# Patient Record
Sex: FEMALE | Race: WHITE
[De-identification: names, ages, dates, MRNs, and addresses within clinical notes are randomized per-mention and may not be internally consistent; named-entity substitution may affect disease eponyms.]

---

## 2018-05-04 ENCOUNTER — APPOINTMENT (OUTPATIENT)
Dept: DERMATOLOGY | Facility: CLINIC | Age: 83
End: 2018-05-04
Payer: MEDICARE

## 2018-05-04 VITALS — HEIGHT: 63 IN | WEIGHT: 146 LBS | BODY MASS INDEX: 25.87 KG/M2

## 2018-05-04 DIAGNOSIS — L82.1 OTHER SEBORRHEIC KERATOSIS: ICD-10-CM

## 2018-05-04 DIAGNOSIS — L81.4 OTHER MELANIN HYPERPIGMENTATION: ICD-10-CM

## 2018-05-04 DIAGNOSIS — Z80.8 FAMILY HISTORY OF MALIGNANT NEOPLASM OF OTHER ORGANS OR SYSTEMS: ICD-10-CM

## 2018-05-04 DIAGNOSIS — L57.0 ACTINIC KERATOSIS: ICD-10-CM

## 2018-05-04 PROCEDURE — 99213 OFFICE O/P EST LOW 20 MIN: CPT | Mod: 25

## 2018-05-04 PROCEDURE — 17003 DESTRUCT PREMALG LES 2-14: CPT

## 2018-05-04 PROCEDURE — 17000 DESTRUCT PREMALG LESION: CPT

## 2018-05-04 RX ORDER — AMOXICILLIN 500 MG/1
500 CAPSULE ORAL
Qty: 24 | Refills: 0 | Status: COMPLETED | COMMUNITY
Start: 2018-03-05

## 2018-05-04 RX ORDER — FEXOFENADINE HCL 60 MG
TABLET ORAL
Refills: 0 | Status: ACTIVE | COMMUNITY

## 2018-05-04 RX ORDER — BENZONATATE 200 MG/1
200 CAPSULE ORAL
Qty: 30 | Refills: 0 | Status: COMPLETED | COMMUNITY
Start: 2018-02-20

## 2020-10-07 ENCOUNTER — EMERGENCY (EMERGENCY)
Facility: HOSPITAL | Age: 85
LOS: 0 days | Discharge: HOME | End: 2020-10-07
Attending: STUDENT IN AN ORGANIZED HEALTH CARE EDUCATION/TRAINING PROGRAM
Payer: MEDICARE

## 2020-10-07 VITALS
HEART RATE: 72 BPM | DIASTOLIC BLOOD PRESSURE: 68 MMHG | SYSTOLIC BLOOD PRESSURE: 131 MMHG | OXYGEN SATURATION: 100 % | TEMPERATURE: 98 F | RESPIRATION RATE: 21 BRPM

## 2020-10-07 VITALS
SYSTOLIC BLOOD PRESSURE: 122 MMHG | OXYGEN SATURATION: 95 % | RESPIRATION RATE: 15 BRPM | HEART RATE: 64 BPM | DIASTOLIC BLOOD PRESSURE: 55 MMHG

## 2020-10-07 DIAGNOSIS — R07.9 CHEST PAIN, UNSPECIFIED: ICD-10-CM

## 2020-10-07 DIAGNOSIS — R06.02 SHORTNESS OF BREATH: ICD-10-CM

## 2020-10-07 DIAGNOSIS — Z98.89 OTHER SPECIFIED POSTPROCEDURAL STATES: Chronic | ICD-10-CM

## 2020-10-07 DIAGNOSIS — D64.9 ANEMIA, UNSPECIFIED: ICD-10-CM

## 2020-10-07 DIAGNOSIS — M81.0 AGE-RELATED OSTEOPOROSIS WITHOUT CURRENT PATHOLOGICAL FRACTURE: ICD-10-CM

## 2020-10-07 DIAGNOSIS — Z96.651 PRESENCE OF RIGHT ARTIFICIAL KNEE JOINT: ICD-10-CM

## 2020-10-07 DIAGNOSIS — Z96.659 PRESENCE OF UNSPECIFIED ARTIFICIAL KNEE JOINT: Chronic | ICD-10-CM

## 2020-10-07 DIAGNOSIS — Z88.6 ALLERGY STATUS TO ANALGESIC AGENT: ICD-10-CM

## 2020-10-07 DIAGNOSIS — E78.5 HYPERLIPIDEMIA, UNSPECIFIED: ICD-10-CM

## 2020-10-07 DIAGNOSIS — M06.9 RHEUMATOID ARTHRITIS, UNSPECIFIED: ICD-10-CM

## 2020-10-07 LAB
ALBUMIN SERPL ELPH-MCNC: 3.5 G/DL — SIGNIFICANT CHANGE UP (ref 3.3–5)
ALP SERPL-CCNC: 96 U/L — SIGNIFICANT CHANGE UP (ref 40–120)
ALT FLD-CCNC: 23 U/L — SIGNIFICANT CHANGE UP (ref 12–78)
ANION GAP SERPL CALC-SCNC: 6 MMOL/L — SIGNIFICANT CHANGE UP (ref 5–17)
APTT BLD: 32.7 SEC — SIGNIFICANT CHANGE UP (ref 27.5–35.5)
AST SERPL-CCNC: 18 U/L — SIGNIFICANT CHANGE UP (ref 15–37)
BASOPHILS # BLD AUTO: 0.11 K/UL — SIGNIFICANT CHANGE UP (ref 0–0.2)
BASOPHILS NFR BLD AUTO: 2.1 % — HIGH (ref 0–2)
BILIRUB SERPL-MCNC: 0.5 MG/DL — SIGNIFICANT CHANGE UP (ref 0.2–1.2)
BUN SERPL-MCNC: 14 MG/DL — SIGNIFICANT CHANGE UP (ref 7–23)
CALCIUM SERPL-MCNC: 8.8 MG/DL — SIGNIFICANT CHANGE UP (ref 8.5–10.1)
CHLORIDE SERPL-SCNC: 105 MMOL/L — SIGNIFICANT CHANGE UP (ref 96–108)
CO2 SERPL-SCNC: 28 MMOL/L — SIGNIFICANT CHANGE UP (ref 22–31)
CREAT SERPL-MCNC: 0.82 MG/DL — SIGNIFICANT CHANGE UP (ref 0.5–1.3)
EOSINOPHIL # BLD AUTO: 0.23 K/UL — SIGNIFICANT CHANGE UP (ref 0–0.5)
EOSINOPHIL NFR BLD AUTO: 4.4 % — SIGNIFICANT CHANGE UP (ref 0–6)
GLUCOSE SERPL-MCNC: 86 MG/DL — SIGNIFICANT CHANGE UP (ref 70–99)
HCT VFR BLD CALC: 44.9 % — SIGNIFICANT CHANGE UP (ref 34.5–45)
HGB BLD-MCNC: 14.5 G/DL — SIGNIFICANT CHANGE UP (ref 11.5–15.5)
IMM GRANULOCYTES NFR BLD AUTO: 0.2 % — SIGNIFICANT CHANGE UP (ref 0–1.5)
INR BLD: 0.97 RATIO — SIGNIFICANT CHANGE UP (ref 0.88–1.16)
LYMPHOCYTES # BLD AUTO: 1.49 K/UL — SIGNIFICANT CHANGE UP (ref 1–3.3)
LYMPHOCYTES # BLD AUTO: 28.4 % — SIGNIFICANT CHANGE UP (ref 13–44)
MAGNESIUM SERPL-MCNC: 2.2 MG/DL — SIGNIFICANT CHANGE UP (ref 1.6–2.6)
MCHC RBC-ENTMCNC: 29.1 PG — SIGNIFICANT CHANGE UP (ref 27–34)
MCHC RBC-ENTMCNC: 32.3 GM/DL — SIGNIFICANT CHANGE UP (ref 32–36)
MCV RBC AUTO: 90.2 FL — SIGNIFICANT CHANGE UP (ref 80–100)
MONOCYTES # BLD AUTO: 0.5 K/UL — SIGNIFICANT CHANGE UP (ref 0–0.9)
MONOCYTES NFR BLD AUTO: 9.5 % — SIGNIFICANT CHANGE UP (ref 2–14)
NEUTROPHILS # BLD AUTO: 2.91 K/UL — SIGNIFICANT CHANGE UP (ref 1.8–7.4)
NEUTROPHILS NFR BLD AUTO: 55.4 % — SIGNIFICANT CHANGE UP (ref 43–77)
NT-PROBNP SERPL-SCNC: 153 PG/ML — SIGNIFICANT CHANGE UP (ref 0–450)
PLATELET # BLD AUTO: 278 K/UL — SIGNIFICANT CHANGE UP (ref 150–400)
POTASSIUM SERPL-MCNC: 3.8 MMOL/L — SIGNIFICANT CHANGE UP (ref 3.5–5.3)
POTASSIUM SERPL-SCNC: 3.8 MMOL/L — SIGNIFICANT CHANGE UP (ref 3.5–5.3)
PROT SERPL-MCNC: 7.2 GM/DL — SIGNIFICANT CHANGE UP (ref 6–8.3)
PROTHROM AB SERPL-ACNC: 11.3 SEC — SIGNIFICANT CHANGE UP (ref 10.6–13.6)
RBC # BLD: 4.98 M/UL — SIGNIFICANT CHANGE UP (ref 3.8–5.2)
RBC # FLD: 13.5 % — SIGNIFICANT CHANGE UP (ref 10.3–14.5)
SODIUM SERPL-SCNC: 139 MMOL/L — SIGNIFICANT CHANGE UP (ref 135–145)
TROPONIN I SERPL-MCNC: <0.015 NG/ML — SIGNIFICANT CHANGE UP (ref 0.01–0.04)
TROPONIN I SERPL-MCNC: <0.015 NG/ML — SIGNIFICANT CHANGE UP (ref 0.01–0.04)
WBC # BLD: 5.25 K/UL — SIGNIFICANT CHANGE UP (ref 3.8–10.5)
WBC # FLD AUTO: 5.25 K/UL — SIGNIFICANT CHANGE UP (ref 3.8–10.5)

## 2020-10-07 PROCEDURE — 93010 ELECTROCARDIOGRAM REPORT: CPT

## 2020-10-07 PROCEDURE — 85025 COMPLETE CBC W/AUTO DIFF WBC: CPT

## 2020-10-07 PROCEDURE — 83880 ASSAY OF NATRIURETIC PEPTIDE: CPT

## 2020-10-07 PROCEDURE — 84484 ASSAY OF TROPONIN QUANT: CPT | Mod: 59

## 2020-10-07 PROCEDURE — 36415 COLL VENOUS BLD VENIPUNCTURE: CPT

## 2020-10-07 PROCEDURE — 93005 ELECTROCARDIOGRAM TRACING: CPT

## 2020-10-07 PROCEDURE — 99284 EMERGENCY DEPT VISIT MOD MDM: CPT | Mod: 25

## 2020-10-07 PROCEDURE — 80053 COMPREHEN METABOLIC PANEL: CPT

## 2020-10-07 PROCEDURE — 85610 PROTHROMBIN TIME: CPT

## 2020-10-07 PROCEDURE — 71045 X-RAY EXAM CHEST 1 VIEW: CPT

## 2020-10-07 PROCEDURE — 85379 FIBRIN DEGRADATION QUANT: CPT

## 2020-10-07 PROCEDURE — 85730 THROMBOPLASTIN TIME PARTIAL: CPT

## 2020-10-07 PROCEDURE — 99285 EMERGENCY DEPT VISIT HI MDM: CPT

## 2020-10-07 PROCEDURE — 83735 ASSAY OF MAGNESIUM: CPT

## 2020-10-07 PROCEDURE — 71045 X-RAY EXAM CHEST 1 VIEW: CPT | Mod: 26

## 2020-10-07 RX ORDER — ACETAMINOPHEN 500 MG
975 TABLET ORAL ONCE
Refills: 0 | Status: COMPLETED | OUTPATIENT
Start: 2020-10-07 | End: 2020-10-07

## 2020-10-07 RX ORDER — FAMOTIDINE 10 MG/ML
20 INJECTION INTRAVENOUS DAILY
Refills: 0 | Status: DISCONTINUED | OUTPATIENT
Start: 2020-10-07 | End: 2020-10-07

## 2020-10-07 RX ADMIN — FAMOTIDINE 20 MILLIGRAM(S): 10 INJECTION INTRAVENOUS at 12:43

## 2020-10-07 RX ADMIN — Medication 30 MILLILITER(S): at 12:44

## 2020-10-07 RX ADMIN — Medication 975 MILLIGRAM(S): at 12:43

## 2020-10-07 NOTE — ED STATDOCS - PMH
Anemia    HLD (hyperlipidemia)    Hyperlipidemia    Osteoporosis    Rheumatoid arthritis    Subdural hematoma  5/2015

## 2020-10-07 NOTE — ED STATDOCS - PSH
S/P appy    S/P evacuation of subdural hematoma    S/P knee replacement  right  S/P tonsillectomy    Status post mastoidectomy  right

## 2020-10-07 NOTE — ED PROVIDER NOTE - PATIENT PORTAL LINK FT
You can access the FollowMyHealth Patient Portal offered by [facility] by registering at the following website: [email or web address]. By joining [employer]’s FollowMyHealth portal, you will also be able to view your health information using other applications (apps) compatible with our system.

## 2020-10-07 NOTE — ED PROVIDER NOTE - PHYSICAL EXAMINATION
Vital signs as available reviewed.  General:  Comfortable, no acute distress.  Head:  Normocephalic, atraumatic.  Eyes:  Conjunctiva pink, no icterus.  Cardiovascular:  Regular rate, soft systolic murmur heard at the right sternal border  Respiratory:  Clear to auscultation, good air entry bilaterally.  Abdomen:  Soft, non-tender.  Musculoskeletal:  No deformity or calf tenderness.  Neurologic: Alert and oriented, moving all extremities.  Skin:  Warm and dry.

## 2020-10-07 NOTE — ED PROVIDER NOTE - NSFOLLOWUPCLINICS_GEN_ALL_ED_FT
[facility]  Family Medicine  [address] [place] [address]  Phone: [phone]  Fax:   Follow Up Time:

## 2020-10-07 NOTE — ED PROVIDER NOTE - NS_ ATTENDINGSCRIBEDETAILS _ED_A_ED_FT
I, George Galvan DO,  performed the initial face to face bedside interview with this patient regarding history of present illness, review of symptoms and relevant past medical, social and family history. I completed an independent physical examination. I was the initial provider who evaluated this patient.    The history, relevant review of systems, past medical and surgical history, medical decision making, and physical examination was documented by the scribe in my presence and I attest to the accuracy of the documentation.

## 2020-10-07 NOTE — ED STATDOCS - PROGRESS NOTE DETAILS
Daksha MACHUCA for ED attending, Dr. Art: 89 y/o F with PMHx of subdural hematoma s/p evacuation, HLD, anemia, RA, osteoporosis, s/p R TKR, s/p mastoidectomy, and s/p appendectomy presents to the ED BIB son c/o sudden onset of +chest pain described as tightness about 30 min PTA. Pain began while making her bed. Still feels chest tightness here in ED. Endorses associated +SOB. No arm pain, back pain, fever, or cough. Denies hx of cardiac disease. Allergic to codeine. PCP: Dr. Santi Bustillo. Will send pt to main ED for further evaluation.

## 2020-10-07 NOTE — ED PROVIDER NOTE - CLINICAL SUMMARY MEDICAL DECISION MAKING FREE TEXT BOX
89 y/o female with no cardiac hx here c/o Chest Pressure starting 30 min PTA arrival. Will  r/o ACS, PE, PNA, and PNX.

## 2020-10-07 NOTE — ED PROVIDER NOTE - PROGRESS NOTE DETAILS
Scribelly AS for Dr. Galvan: case discussed with Dr. Evans, reports that if second troponin is negative, pt can follow up as out patient in the office. patient feels better, chest pain resolved. anticipated plan of repeat troponin and discharge discussed with patient, she agrees with the plan. Barry ROMO: Received sign out from Dr. Galvan at 3 pm. Plan is to have patient's second troponin result. If second troponin is negative as per Dr. Galvan, patient is OK to go home. Patient's presentation discussed with Dr. Kendall by Dr. Galvan. As per sign out Cardiology is OK with patient going home. Patient also wishes to go home. Patient provided with the results of the labs and imaging prior to discharge. Instructed patient to return if any worsening of the symptoms or if any health concerns. Patien is happy to leave and will return if any health concerns.

## 2020-10-07 NOTE — ED PROVIDER NOTE - CARE PROVIDER_API CALL
Don Kendall  CARDIOVASCULAR DISEASE  [address], [place], [address]  [place] [address]  Phone: [phone]  Fax: [phone]  Follow Up Time: 1-3 Days

## 2020-10-07 NOTE — ED PROVIDER NOTE - NSFOLLOWUPINSTRUCTIONS_ED_ALL_ED_FT
Chest Pain    WHAT YOU NEED TO KNOW:    Chest pain can be caused by a range of conditions, from not serious to life-threatening. Chest pain can be a symptom of a digestive problem, such as acid reflux or a stomach ulcer. An anxiety attack or a strong emotion, such as anger, can also cause chest pain. Infection, inflammation, or a fracture in the bones or cartilage in your chest can cause pain or discomfort. Sometimes chest pain or pressure is caused by poor blood flow to your heart (angina). Chest pain may also be caused by life-threatening conditions such as a heart attack or blood clot in your lungs.     DISCHARGE INSTRUCTIONS:    Call 911 if:     You have any of the following signs of a heart attack:   Squeezing, pressure, or pain in your chest       and any of the following:   Discomfort or pain in your back, neck, jaw, stomach, or arm       Shortness of breath      Nausea or vomiting      Lightheadedness or a sudden cold sweat        Return to the emergency department if:     You have chest discomfort that gets worse, even with medicine.      You cough or vomit blood.       Your bowel movements are black or bloody.       You cannot stop vomiting, or it hurts to swallow.     Contact your healthcare provider if:     You have questions or concerns about your condition or care.        Medicines:     Medicines may be given to treat the cause of your chest pain. Examples include pain medicine, anxiety medicine, or medicines to increase blood flow to your heart.       Do not take certain medicines without asking your healthcare provider first. These include NSAIDs, herbal or vitamin supplements, or hormones (estrogen or progestin).       Take your medicine as directed. Contact your healthcare provider if you think your medicine is not helping or if you have side effects. Tell him or her if you are allergic to any medicine. Keep a list of the medicines, vitamins, and herbs you take. Include the amounts, and when and why you take them. Bring the list or the pill bottles to follow-up visits. Carry your medicine list with you in case of an emergency.    Follow up with your healthcare provider within 72 hours, or as directed: You may need to return for more tests to find the cause of your chest pain. You may be referred to a specialist, such as a cardiologist or gastroenterologist. Write down your questions so you remember to ask them during your visits.     Healthy living tips: The following are general healthy guidelines. If your chest pain is caused by a heart problem, your healthcare provider will give you specific guidelines to follow.    Do not smoke. Nicotine and other chemicals in cigarettes and cigars can cause lung and heart damage. Ask your healthcare provider for information if you currently smoke and need help to quit. E-cigarettes or smokeless tobacco still contain nicotine. Talk to your healthcare provider before you use these products.       Eat a variety of healthy, low-fat, low-salt foods. Healthy foods include fruits, vegetables, whole-grain breads, low-fat dairy products, beans, lean meats, and fish. Ask for more information about a heart healthy diet.      Drink plenty of water every day. Your body is made of mostly water. Water helps your body to control your temperature and blood pressure. Ask your healthcare provider how much water you should drink every day.      Ask about activity. Your healthcare provider will tell you which activities to limit or avoid. Ask when you can drive, return to work, and have sex. Ask about the best exercise plan for you.      Maintain a healthy weight. Ask your healthcare provider how much you should weigh. Ask him or her to help you create a weight loss plan if you are overweight.       Get the flu and pneumonia vaccines. All adults should get the influenza (flu) vaccine. Get it every year as soon as it becomes available. The pneumococcal vaccine is given to adults aged 65 years or older. The vaccine is given every 5 years to prevent pneumococcal disease, such as pneumonia.    If you have a stent:     Carry your stent card with you at all times.       Let all healthcare providers know that you have a stent. You have been provided with the results of your labs and imaging. As we discussed, we can not conclusively rule out the possibility of a heart attack with only the two sets of troponin enzymes that we had done in the emergency room however as per discussion between Dr. Galvan and cardiologist Dr. Kendall you are OK to leave but you are to follow up with cardiology in two days or less and return to us immediately if you have any repeat of the symptoms including any chest pain, shortness of breath, palpitation, or if any health concerns. For all other emergencies or if you change your mind and wish to be admitted to the hospital for further evaluation of your chest pain return to us immediately.    _______________      Chest Pain    WHAT YOU NEED TO KNOW:    Chest pain can be caused by a range of conditions, from not serious to life-threatening. Chest pain can be a symptom of a digestive problem, such as acid reflux or a stomach ulcer. An anxiety attack or a strong emotion, such as anger, can also cause chest pain. Infection, inflammation, or a fracture in the bones or cartilage in your chest can cause pain or discomfort. Sometimes chest pain or pressure is caused by poor blood flow to your heart (angina). Chest pain may also be caused by life-threatening conditions such as a heart attack or blood clot in your lungs.     DISCHARGE INSTRUCTIONS:    Call 911 if:     You have any of the following signs of a heart attack:   Squeezing, pressure, or pain in your chest       and any of the following:   Discomfort or pain in your back, neck, jaw, stomach, or arm       Shortness of breath      Nausea or vomiting      Lightheadedness or a sudden cold sweat        Return to the emergency department if:     You have chest discomfort that gets worse, even with medicine.      You cough or vomit blood.       Your bowel movements are black or bloody.       You cannot stop vomiting, or it hurts to swallow.     Contact your healthcare provider if:     You have questions or concerns about your condition or care.        Medicines:     Medicines may be given to treat the cause of your chest pain. Examples include pain medicine, anxiety medicine, or medicines to increase blood flow to your heart.       Do not take certain medicines without asking your healthcare provider first. These include NSAIDs, herbal or vitamin supplements, or hormones (estrogen or progestin).       Take your medicine as directed. Contact your healthcare provider if you think your medicine is not helping or if you have side effects. Tell him or her if you are allergic to any medicine. Keep a list of the medicines, vitamins, and herbs you take. Include the amounts, and when and why you take them. Bring the list or the pill bottles to follow-up visits. Carry your medicine list with you in case of an emergency.    Follow up with your healthcare provider within 72 hours, or as directed: You may need to return for more tests to find the cause of your chest pain. You may be referred to a specialist, such as a cardiologist or gastroenterologist. Write down your questions so you remember to ask them during your visits.     Healthy living tips: The following are general healthy guidelines. If your chest pain is caused by a heart problem, your healthcare provider will give you specific guidelines to follow.    Do not smoke. Nicotine and other chemicals in cigarettes and cigars can cause lung and heart damage. Ask your healthcare provider for information if you currently smoke and need help to quit. E-cigarettes or smokeless tobacco still contain nicotine. Talk to your healthcare provider before you use these products.       Eat a variety of healthy, low-fat, low-salt foods. Healthy foods include fruits, vegetables, whole-grain breads, low-fat dairy products, beans, lean meats, and fish. Ask for more information about a heart healthy diet.      Drink plenty of water every day. Your body is made of mostly water. Water helps your body to control your temperature and blood pressure. Ask your healthcare provider how much water you should drink every day.      Ask about activity. Your healthcare provider will tell you which activities to limit or avoid. Ask when you can drive, return to work, and have sex. Ask about the best exercise plan for you.      Maintain a healthy weight. Ask your healthcare provider how much you should weigh. Ask him or her to help you create a weight loss plan if you are overweight.       Get the flu and pneumonia vaccines. All adults should get the influenza (flu) vaccine. Get it every year as soon as it becomes available. The pneumococcal vaccine is given to adults aged 65 years or older. The vaccine is given every 5 years to prevent pneumococcal disease, such as pneumonia.    If you have a stent:     Carry your stent card with you at all times.       Let all healthcare providers know that you have a stent.

## 2020-10-07 NOTE — ED PROVIDER NOTE - NS ED ROS FT
Constitutional: No fever.  Neurological: No headache.  Eyes: No vision changes.   Ears, Nose, Mouth, Throat: No congestion.  Cardiovascular: +CP  Respiratory: +slight SOB  Gastrointestinal: No nausea or vomiting.  Genitourinary: No dysuria.  Musculoskeletal: No joint pain. +minimal back pain  Integumentary (skin and/or breast): No rash.

## 2020-10-07 NOTE — ED PROVIDER NOTE - OBJECTIVE STATEMENT
89 y/o F with PMHx of subdural hematoma s/p evacuation, HLD, anemia, RA, osteoporosis, s/p R TKR, s/p mastoidectomy, and s/p appendectomy presents to the ED c/o sudden onset of constant CP described as a tightness in the chest about 30 min PTA. Pt states there is minimal back pain. Pain started while pt was making her bed. Pt has no hx of cardiac disease. +SOB when mouth closed. Denies arm pain, back pain, fever, and cough dizziness, N/V, diaphoresis. Currently, pt states that her tightness has improved but still feels is a little now. Pt lives with her son. Allergic to codeine. Pt take Allegra.   PCP: Dr. Santi Bustillo.

## 2020-10-07 NOTE — ED ADULT NURSE NOTE - OBJECTIVE STATEMENT
89 year old AxOX 3 female patient presents with complaints of chest tightness since this morning. Endorses SOB. Denies any radiation to arm, neck or back. Denies n/v. Patient denies any pain or discomfort at this time.

## 2020-10-09 ENCOUNTER — NON-APPOINTMENT (OUTPATIENT)
Age: 85
End: 2020-10-09

## 2020-10-09 ENCOUNTER — APPOINTMENT (OUTPATIENT)
Dept: CARDIOLOGY | Facility: CLINIC | Age: 85
End: 2020-10-09
Payer: MEDICARE

## 2020-10-09 VITALS
TEMPERATURE: 98.5 F | BODY MASS INDEX: 25.87 KG/M2 | SYSTOLIC BLOOD PRESSURE: 132 MMHG | DIASTOLIC BLOOD PRESSURE: 74 MMHG | HEIGHT: 63 IN | OXYGEN SATURATION: 97 % | WEIGHT: 146 LBS | HEART RATE: 66 BPM | RESPIRATION RATE: 14 BRPM

## 2020-10-09 DIAGNOSIS — R07.9 CHEST PAIN, UNSPECIFIED: ICD-10-CM

## 2020-10-09 DIAGNOSIS — R01.1 CARDIAC MURMUR, UNSPECIFIED: ICD-10-CM

## 2020-10-09 PROBLEM — M81.0 AGE-RELATED OSTEOPOROSIS WITHOUT CURRENT PATHOLOGICAL FRACTURE: Chronic | Status: ACTIVE | Noted: 2020-10-07

## 2020-10-09 PROBLEM — E78.5 HYPERLIPIDEMIA, UNSPECIFIED: Chronic | Status: ACTIVE | Noted: 2020-10-07

## 2020-10-09 PROBLEM — D64.9 ANEMIA, UNSPECIFIED: Chronic | Status: ACTIVE | Noted: 2020-10-07

## 2020-10-09 PROBLEM — M06.9 RHEUMATOID ARTHRITIS, UNSPECIFIED: Chronic | Status: ACTIVE | Noted: 2020-10-07

## 2020-10-09 PROCEDURE — 99215 OFFICE O/P EST HI 40 MIN: CPT

## 2020-10-09 PROCEDURE — 93000 ELECTROCARDIOGRAM COMPLETE: CPT

## 2020-10-09 NOTE — DISCUSSION/SUMMARY
[FreeTextEntry1] : Chest pain: Resolved. Here today in ER F/U  Troponin, D-Dimer and EKG were negative.  Currently asymptomatic with NL EKG.  Murmur auscultated upon exam will return for Echocardiogram and follow  up OV with Dr Kendall

## 2020-10-09 NOTE — PHYSICAL EXAM
[General Appearance - Well Developed] : well developed [Normal Appearance] : normal appearance [Well Groomed] : well groomed [General Appearance - Well Nourished] : well nourished [No Deformities] : no deformities [General Appearance - In No Acute Distress] : no acute distress [Normal Conjunctiva] : the conjunctiva exhibited no abnormalities [Heart Rate And Rhythm] : heart rate and rhythm were normal [Heart Sounds] : normal S1 and S2 [] : no respiratory distress [Respiration, Rhythm And Depth] : normal respiratory rhythm and effort [Exaggerated Use Of Accessory Muscles For Inspiration] : no accessory muscle use [Auscultation Breath Sounds / Voice Sounds] : lungs were clear to auscultation bilaterally [Abdomen Soft] : soft [Abnormal Walk] : normal gait [FreeTextEntry1] : No LE Edema  [Skin Color & Pigmentation] : normal skin color and pigmentation [Oriented To Time, Place, And Person] : oriented to person, place, and time

## 2021-09-05 ENCOUNTER — EMERGENCY (EMERGENCY)
Facility: HOSPITAL | Age: 86
LOS: 0 days | Discharge: HOME | End: 2021-09-06
Attending: EMERGENCY MEDICINE
Payer: MEDICARE

## 2021-09-05 VITALS — HEIGHT: 63 IN | WEIGHT: 138.01 LBS

## 2021-09-05 VITALS
HEART RATE: 59 BPM | SYSTOLIC BLOOD PRESSURE: 131 MMHG | WEIGHT: 147.27 LBS | RESPIRATION RATE: 17 BRPM | TEMPERATURE: 98 F | OXYGEN SATURATION: 100 % | DIASTOLIC BLOOD PRESSURE: 54 MMHG

## 2021-09-05 DIAGNOSIS — Z98.89 OTHER SPECIFIED POSTPROCEDURAL STATES: Chronic | ICD-10-CM

## 2021-09-05 DIAGNOSIS — Z88.5 ALLERGY STATUS TO NARCOTIC AGENT: ICD-10-CM

## 2021-09-05 DIAGNOSIS — M06.9 RHEUMATOID ARTHRITIS, UNSPECIFIED: ICD-10-CM

## 2021-09-05 DIAGNOSIS — M81.0 AGE-RELATED OSTEOPOROSIS WITHOUT CURRENT PATHOLOGICAL FRACTURE: ICD-10-CM

## 2021-09-05 DIAGNOSIS — Z96.651 PRESENCE OF RIGHT ARTIFICIAL KNEE JOINT: ICD-10-CM

## 2021-09-05 DIAGNOSIS — E78.5 HYPERLIPIDEMIA, UNSPECIFIED: ICD-10-CM

## 2021-09-05 DIAGNOSIS — R07.89 OTHER CHEST PAIN: ICD-10-CM

## 2021-09-05 DIAGNOSIS — Z90.89 ACQUIRED ABSENCE OF OTHER ORGANS: ICD-10-CM

## 2021-09-05 DIAGNOSIS — Z96.659 PRESENCE OF UNSPECIFIED ARTIFICIAL KNEE JOINT: Chronic | ICD-10-CM

## 2021-09-05 DIAGNOSIS — Z87.820 PERSONAL HISTORY OF TRAUMATIC BRAIN INJURY: ICD-10-CM

## 2021-09-05 DIAGNOSIS — D64.9 ANEMIA, UNSPECIFIED: ICD-10-CM

## 2021-09-05 DIAGNOSIS — Z20.822 CONTACT WITH AND (SUSPECTED) EXPOSURE TO COVID-19: ICD-10-CM

## 2021-09-05 LAB
ALBUMIN SERPL ELPH-MCNC: 3.6 G/DL — SIGNIFICANT CHANGE UP (ref 3.3–5)
ALP SERPL-CCNC: 92 U/L — SIGNIFICANT CHANGE UP (ref 40–120)
ALT FLD-CCNC: 23 U/L — SIGNIFICANT CHANGE UP (ref 12–78)
ANION GAP SERPL CALC-SCNC: 6 MMOL/L — SIGNIFICANT CHANGE UP (ref 5–17)
APTT BLD: 31.8 SEC — SIGNIFICANT CHANGE UP (ref 27.5–35.5)
AST SERPL-CCNC: 28 U/L — SIGNIFICANT CHANGE UP (ref 15–37)
BASOPHILS # BLD AUTO: 0.13 K/UL — SIGNIFICANT CHANGE UP (ref 0–0.2)
BASOPHILS NFR BLD AUTO: 2.1 % — HIGH (ref 0–2)
BILIRUB SERPL-MCNC: 0.6 MG/DL — SIGNIFICANT CHANGE UP (ref 0.2–1.2)
BUN SERPL-MCNC: 11 MG/DL — SIGNIFICANT CHANGE UP (ref 7–23)
CALCIUM SERPL-MCNC: 8.5 MG/DL — SIGNIFICANT CHANGE UP (ref 8.5–10.1)
CHLORIDE SERPL-SCNC: 107 MMOL/L — SIGNIFICANT CHANGE UP (ref 96–108)
CO2 SERPL-SCNC: 26 MMOL/L — SIGNIFICANT CHANGE UP (ref 22–31)
CREAT SERPL-MCNC: 0.92 MG/DL — SIGNIFICANT CHANGE UP (ref 0.5–1.3)
EOSINOPHIL # BLD AUTO: 0.21 K/UL — SIGNIFICANT CHANGE UP (ref 0–0.5)
EOSINOPHIL NFR BLD AUTO: 3.4 % — SIGNIFICANT CHANGE UP (ref 0–6)
GLUCOSE SERPL-MCNC: 121 MG/DL — HIGH (ref 70–99)
HCT VFR BLD CALC: 45.4 % — HIGH (ref 34.5–45)
HGB BLD-MCNC: 14.8 G/DL — SIGNIFICANT CHANGE UP (ref 11.5–15.5)
IMM GRANULOCYTES NFR BLD AUTO: 0.3 % — SIGNIFICANT CHANGE UP (ref 0–1.5)
INR BLD: 0.97 RATIO — SIGNIFICANT CHANGE UP (ref 0.88–1.16)
LYMPHOCYTES # BLD AUTO: 1.47 K/UL — SIGNIFICANT CHANGE UP (ref 1–3.3)
LYMPHOCYTES # BLD AUTO: 24 % — SIGNIFICANT CHANGE UP (ref 13–44)
MCHC RBC-ENTMCNC: 28.9 PG — SIGNIFICANT CHANGE UP (ref 27–34)
MCHC RBC-ENTMCNC: 32.6 GM/DL — SIGNIFICANT CHANGE UP (ref 32–36)
MCV RBC AUTO: 88.7 FL — SIGNIFICANT CHANGE UP (ref 80–100)
MONOCYTES # BLD AUTO: 0.43 K/UL — SIGNIFICANT CHANGE UP (ref 0–0.9)
MONOCYTES NFR BLD AUTO: 7 % — SIGNIFICANT CHANGE UP (ref 2–14)
NEUTROPHILS # BLD AUTO: 3.87 K/UL — SIGNIFICANT CHANGE UP (ref 1.8–7.4)
NEUTROPHILS NFR BLD AUTO: 63.2 % — SIGNIFICANT CHANGE UP (ref 43–77)
NT-PROBNP SERPL-SCNC: 268 PG/ML — SIGNIFICANT CHANGE UP (ref 0–450)
PLATELET # BLD AUTO: 261 K/UL — SIGNIFICANT CHANGE UP (ref 150–400)
POTASSIUM SERPL-MCNC: 3.8 MMOL/L — SIGNIFICANT CHANGE UP (ref 3.5–5.3)
POTASSIUM SERPL-SCNC: 3.8 MMOL/L — SIGNIFICANT CHANGE UP (ref 3.5–5.3)
PROT SERPL-MCNC: 7.2 GM/DL — SIGNIFICANT CHANGE UP (ref 6–8.3)
PROTHROM AB SERPL-ACNC: 11.3 SEC — SIGNIFICANT CHANGE UP (ref 10.6–13.6)
RBC # BLD: 5.12 M/UL — SIGNIFICANT CHANGE UP (ref 3.8–5.2)
RBC # FLD: 13.4 % — SIGNIFICANT CHANGE UP (ref 10.3–14.5)
SODIUM SERPL-SCNC: 139 MMOL/L — SIGNIFICANT CHANGE UP (ref 135–145)
TROPONIN I SERPL-MCNC: <0.015 NG/ML — SIGNIFICANT CHANGE UP (ref 0.01–0.04)
TROPONIN I SERPL-MCNC: <0.015 NG/ML — SIGNIFICANT CHANGE UP (ref 0.01–0.04)
WBC # BLD: 6.13 K/UL — SIGNIFICANT CHANGE UP (ref 3.8–10.5)
WBC # FLD AUTO: 6.13 K/UL — SIGNIFICANT CHANGE UP (ref 3.8–10.5)

## 2021-09-05 PROCEDURE — 80061 LIPID PANEL: CPT

## 2021-09-05 PROCEDURE — 99284 EMERGENCY DEPT VISIT MOD MDM: CPT | Mod: CS

## 2021-09-05 PROCEDURE — 85610 PROTHROMBIN TIME: CPT

## 2021-09-05 PROCEDURE — 93306 TTE W/DOPPLER COMPLETE: CPT

## 2021-09-05 PROCEDURE — 83880 ASSAY OF NATRIURETIC PEPTIDE: CPT

## 2021-09-05 PROCEDURE — 85025 COMPLETE CBC W/AUTO DIFF WBC: CPT

## 2021-09-05 PROCEDURE — 99284 EMERGENCY DEPT VISIT MOD MDM: CPT | Mod: 25

## 2021-09-05 PROCEDURE — 93010 ELECTROCARDIOGRAM REPORT: CPT

## 2021-09-05 PROCEDURE — 85730 THROMBOPLASTIN TIME PARTIAL: CPT

## 2021-09-05 PROCEDURE — G0378: CPT

## 2021-09-05 PROCEDURE — 99219: CPT

## 2021-09-05 PROCEDURE — 84484 ASSAY OF TROPONIN QUANT: CPT | Mod: 59

## 2021-09-05 PROCEDURE — U0005: CPT

## 2021-09-05 PROCEDURE — 71045 X-RAY EXAM CHEST 1 VIEW: CPT

## 2021-09-05 PROCEDURE — 36415 COLL VENOUS BLD VENIPUNCTURE: CPT

## 2021-09-05 PROCEDURE — U0003: CPT

## 2021-09-05 PROCEDURE — 71045 X-RAY EXAM CHEST 1 VIEW: CPT | Mod: 26

## 2021-09-05 PROCEDURE — 80053 COMPREHEN METABOLIC PANEL: CPT

## 2021-09-05 PROCEDURE — 93005 ELECTROCARDIOGRAM TRACING: CPT

## 2021-09-05 RX ORDER — ASPIRIN/CALCIUM CARB/MAGNESIUM 324 MG
81 TABLET ORAL DAILY
Refills: 0 | Status: DISCONTINUED | OUTPATIENT
Start: 2021-09-05 | End: 2021-09-06

## 2021-09-05 RX ORDER — INFLUENZA VIRUS VACCINE 15; 15; 15; 15 UG/.5ML; UG/.5ML; UG/.5ML; UG/.5ML
0.5 SUSPENSION INTRAMUSCULAR ONCE
Refills: 0 | Status: DISCONTINUED | OUTPATIENT
Start: 2021-09-05 | End: 2021-09-06

## 2021-09-05 NOTE — ED PROVIDER NOTE - CROS ED GI ALL NEG
negative... Carac Pregnancy And Lactation Text: This medication is Pregnancy Category X and contraindicated in pregnancy and in women who may become pregnant. It is unknown if this medication is excreted in breast milk.

## 2021-09-05 NOTE — H&P ADULT - HISTORY OF PRESENT ILLNESS
89/F with PMHx of subdural hematoma s/p evacuation, HLD, anemia, RA, osteoporosis, s/p R TKR, s/p mastoidectomy, and s/p appendectomy, brought  to the ED for  c/o sudden onset  chest pain. She states that it was like tightness in the chest this morning. Still has some heaviness in chest. No known Hx of MI. stents, Angina. No c/o back pain, fever, cough, dizziness, N/V/D, diaphoresis.    1st trop neg

## 2021-09-05 NOTE — ED ADULT NURSE NOTE - OBJECTIVE STATEMENT
pt presents to ED with complaints of chest pain starting last night. pt states she has never had chest pain before. 18 g placed to right AC. labs sent. EKG performed. Tele and VS monitoring initiated.

## 2021-09-05 NOTE — ED PROVIDER NOTE - OBJECTIVE STATEMENT
89 y/o F with PMHx of subdural hematoma s/p evacuation, HLD, anemia, RA, osteoporosis, s/p R TKR, s/p mastoidectomy, and s/p appendectomy presents to the ED c/o sudden onset of constant CP described as a tightness in the chest about 30 min PTA. Pt states there is minimal back pain. Pain started while pt was making her bed. Pt has no hx of cardiac disease. +SOB when mouth closed. Denies arm pain, back pain, fever, and cough dizziness, N/V, diaphoresis. Currently, pt states that her tightness has improved but still feels is a little now. Pt lives with her son. Allergic to codeine. Pt take Allegra.   PCP: Dr. Santi Bustillo. PA: Patient is a 89 year old female with PMHx of subdural hematoma s/p evacuation, HLD, anemia, RA, osteoporosis, s/p R TKR, s/p mastoidectomy, and s/p appendectomy, who presents to [facility] c/o sudden onset of constant CP described as a tightness in the chest this morning. DENIES any known Hx of cardiac disease. DENIES radiation of pain, back pain, fever, cough, dizziness, N/V/D, diaphoresis. ~Marcin Aguila PA-C

## 2021-09-05 NOTE — H&P ADULT - NSICDXPASTMEDICALHX_GEN_ALL_CORE_FT
PAST MEDICAL HISTORY:  Anemia     HLD (hyperlipidemia)     Hyperlipidemia     Osteoporosis     Rheumatoid arthritis     Subdural hematoma 5/2015

## 2021-09-05 NOTE — ED ADULT NURSE NOTE - NSIMPLEMENTINTERV_GEN_ALL_ED
Implemented All Universal Safety Interventions:  [place] to call system. Call bell, personal items and telephone within reach. Instruct patient to call for assistance. Room bathroom lighting operational. Non-slip footwear when patient is off stretcher. Physically safe environment: no spills, clutter or unnecessary equipment. Stretcher in lowest position, wheels locked, appropriate side rails in place.

## 2021-09-05 NOTE — ED PROVIDER NOTE - CHPI ED SYMPTOMS NEG
no fever/no nausea/no syncope no back pain/no cough/no dizziness/no fever/no nausea/no shortness of breath/no syncope/no vomiting/no chills/no diaphoresis

## 2021-09-05 NOTE — H&P ADULT - ASSESSMENT
89/F with PMHx of subdural hematoma s/p evacuation, HLD, anemia, RA, osteoporosis, s/p R TKR, s/p mastoidectomy, and s/p appendectomy, brought  to the ED for  c/o sudden onset  chest pain. She states that it was like tightness in the chest this morning. Still has some heaviness in chest. No known Hx of MI. stents, Angina. No c/o back pain, fever, cough, dizziness, N/V/D, diaphoresis.    1st trop neg    Pt admitted with     1) Chest Pain: TINO vs GERD  observe on tele  serial cardiac enx  asa 81  lipid panel  echo  cardio consult    2) DVT PPX: lovenox    poc discussed with pt, team

## 2021-09-05 NOTE — H&P ADULT - NSHPLABSRESULTS_GEN_ALL_CORE
Lab Results:  CBC  CBC Full  -  ( 05 Sep 2021 14:44 )  WBC Count : 6.13 K/uL  RBC Count : 5.12 M/uL  Hemoglobin : 14.8 g/dL  Hematocrit : 45.4 %  Platelet Count - Automated : 261 K/uL  Mean Cell Volume : 88.7 fl  Mean Cell Hemoglobin : 28.9 pg  Mean Cell Hemoglobin Concentration : 32.6 gm/dL  Auto Neutrophil # : 3.87 K/uL  Auto Lymphocyte # : 1.47 K/uL  Auto Monocyte # : 0.43 K/uL  Auto Eosinophil # : 0.21 K/uL  Auto Basophil # : 0.13 K/uL  Auto Neutrophil % : 63.2 %  Auto Lymphocyte % : 24.0 %  Auto Monocyte % : 7.0 %  Auto Eosinophil % : 3.4 %  Auto Basophil % : 2.1 %    .		Differential:	[] Automated		[] Manual  Chemistry                        14.8   6.13  )-----------( 261      ( 05 Sep 2021 14:44 )             45.4     09-05    139  |  107  |  11  ----------------------------<  121<H>  3.8   |  26  |  0.92    Ca    8.5      05 Sep 2021 14:44    TPro  7.2  /  Alb  3.6  /  TBili  0.6  /  DBili  x   /  AST  28  /  ALT  23  /  AlkPhos  92  09-05    LIVER FUNCTIONS - ( 05 Sep 2021 14:44 )  Alb: 3.6 g/dL / Pro: 7.2 gm/dL / ALK PHOS: 92 U/L / ALT: 23 U/L / AST: 28 U/L / GGT: x           PT/INR - ( 05 Sep 2021 14:44 )   PT: 11.3 sec;   INR: 0.97 ratio         PTT - ( 05 Sep 2021 14:44 )  PTT:31.8 sec      EKG: nsr 68      RADIOLOGY RESULTS:    cxr: clear    MEDICATIONS  (STANDING):  aspirin  chewable 81 milliGRAM(s) Oral daily    MEDICATIONS  (PRN):  aluminum hydroxide/magnesium hydroxide/simethicone Suspension 30 milliLiter(s) Oral every 6 hours PRN Dyspepsia

## 2021-09-05 NOTE — ED PROVIDER NOTE - NSICDXPASTSURGICALHX_GEN_ALL_CORE_FT
PAST SURGICAL HISTORY:  S/P appy     S/P evacuation of subdural hematoma     S/P knee replacement right    S/P tonsillectomy     Status post mastoidectomy right

## 2021-09-06 ENCOUNTER — TRANSCRIPTION ENCOUNTER (OUTPATIENT)
Age: 86
End: 2021-09-06

## 2021-09-06 LAB
CHOLEST SERPL-MCNC: 284 MG/DL — HIGH
HDLC SERPL-MCNC: 52 MG/DL — SIGNIFICANT CHANGE UP
LIPID PNL WITH DIRECT LDL SERPL: 195 MG/DL — HIGH
NON HDL CHOLESTEROL: 232 MG/DL — HIGH
TRIGL SERPL-MCNC: 187 MG/DL — HIGH

## 2021-09-06 PROCEDURE — 93306 TTE W/DOPPLER COMPLETE: CPT | Mod: 26

## 2021-09-06 PROCEDURE — 99217: CPT

## 2021-09-06 RX ADMIN — Medication 81 MILLIGRAM(S): at 11:23

## 2021-09-06 NOTE — DISCHARGE NOTE NURSING/CASE MANAGEMENT/SOCIAL WORK - PATIENT PORTAL LINK FT
You can access the FollowMyHealth Patient Portal offered by [facility] by registering at the following website: [email or web address]. By joining [employer]’s FollowMyHealth portal, you will also be able to view your health information using other applications (apps) compatible with our system.

## 2021-09-06 NOTE — PROGRESS NOTE ADULT - ASSESSMENT
89/F with PMHx of subdural hematoma s/p evacuation, HLD, anemia, RA, osteoporosis, s/p R TKR, s/p mastoidectomy, and s/p appendectomy, brought  to the ED for  c/o sudden onset  chest pain. She states that it was like tightness in the chest this morning. Still has some heaviness in chest. No known Hx of MI. stents, Angina. No c/o back pain, fever, cough, dizziness, N/V/D, diaphoresis.      1) Chest Pain:   - Possible GERD/Atypical CP  observe on tele  serial cardiac enx  asa 81  lipid panel  echo  cardio consult    2) DVT PPX: lovenox

## 2021-09-06 NOTE — CONSULT NOTE ADULT - CONSULT REASON
Social History:   reports that she has never smoked. She has never used smokeless tobacco. She reports that she drinks about 0.6 oz of alcohol per week . She reports that she does not use drugs. Family History: family history includes Arthritis in her maternal grandfather. Allergies:  Nsaids [nsaids]; Hctz; Polysporin [bacitracin-polymyxin b]; and Tolmetin    Home Medications:    Prior to Admission medications    Medication Sig Start Date End Date Taking? Authorizing Provider   verapamil (CALAN) 80 MG tablet Take 80 mg by mouth 3 times daily   Yes Historical Provider, MD   propafenone (RYTHMOL) 300 MG tablet Take 1 tablet by mouth daily as needed (One Tablet Daily As Needed) Take 1 tablets by mouth as needed. Patient taking differently: Take 150 mg by mouth daily as needed (One Tablet Daily As Needed) Take 1 tablets by mouth as needed. 9/6/17  Yes Audelia Hernadez MD   dabigatran (PRADAXA) 150 MG capsule Take 1 capsule by mouth as needed (a fib) 9/1/17  Yes Audelia Hernadez MD   levothyroxine (SYNTHROID) 75 MCG tablet Take 1 tablet by mouth daily 2/9/17  Yes Gail Alicea MD   metoprolol (LOPRESSOR) 100 MG tablet Take 1.5 tablets by mouth 2 times daily  Patient taking differently: Take 50 mg by mouth three times daily  7/18/16  Yes Gail Alicea MD   estradiol (ESTRACE) 0.1 MG/GM vaginal cream PLACE 1 GRAM VAGINALLY TWICE A WEEK 10/1/18   Jen Quijano DO   lisinopril (PRINIVIL;ZESTRIL) 20 MG tablet Take 1 tablet by mouth daily 7/18/16   Gail Alicea MD            REVIEW OF SYSTEMS:    All 14-point review of systems are completed and pertinent positives are mentioned in the history of present illness. Other systems are reviewed and are negative. Physical Examination:    /74   Pulse 57   SpO2 98%    Constitutional and General Appearance: alert, cooperative, no distress and appears stated age  HEENT: PERRL, no cervical lymphadenopathy. No masses palpable.  Normal oral mucosa Respiratory:  · Normal excursion and expansion without use of accessory muscles  · Resp Auscultation: Normal breath sounds without dullness or wheezing  Cardiovascular:  · The apical impulse is not displaced  · Heart tones are crisp and normal. regular S1 and S2.  · Jugular venous pulsation Normal  · The carotid upstroke is normal in amplitude and contour without delay or bruit  · Peripheral pulses are symmetrical and full  Abdomen:  · No masses or tenderness  · Bowel sounds present  Extremities:  ·  No Cyanosis or Clubbing  ·  Lower extremity edema: No  · Skin: Warm and dry  Neurological:  · Alert and oriented. · Moves all extremities well  · No abnormalities of mood, affect, memory, mentation, or behavior are noted      DATA:    ECG: Sinus martha 55  ECHO: 1/5/2013  CONCLUSION-     Echocardiogram with a Doppler shows normal systolic function of left  ventricle with ejection fraction of 55%. No significant valvular  heart disease is seen. IMPRESSION:     Paroxysmal atrial fibrillation      [id number] Score for Atrial Fibrillation Stroke Risk   Risk   Factors  Component Value   C CHF No 0   H HTN Yes 1   A2 Age >= 76 Yes,  [de-identified] y.o.) 2   D DM No 0   S2 Prior Stroke/TIA No 0   V Vascular Disease No 0   A Age 74-69 No,  [de-identified] y.o.) 0   Sc Sex female 1    [id number]  Score  4   Score last updated 16/0/30 7:79 PM    Click here for a link to the UpToDate guideline \"Atrial Fibrillation: Anticoagulation therapy to prevent embolization    Disclaimer: Risk Score calculation is dependent on accuracy of patient problem list and past encounter diagnosis. RECOMMENDATIONS:  1. Increase Rythmol to 225 mg as needed for episodes of afib. 2. Discussed the effects of these medications on your heart. 3. Discussed the risk of stroke with Paroxysmal atrial fibrillation and persistent afib and that the risk for stroke is equivalent. 4. Recommend long term anticoagulation. Pradaxa 150 mg twice daily. 5. Check Eliquis 5 mg twice daily or Xarelto 20 mg daily. 6. Follow up with Beryl Rojas CNP in 1 year. QUALITY MEASURES  1. Tobacco Cessation Counseling: NA  2. Retake of BP if >140/90:   NA  3. Documentation to PCP/referring for new patient:  Sent to PCP at close of office visit  4. CAD patient on anti-platelet: NA  5. CAD patient on STATIN therapy:  NA  6. Patient with CHF and aFib on anticoagulation:  Yes, as needed Pradaxa     This note was scribed in the presence of Dr. Shanelle Tariq by Blane Brooks RN. The scribes docuementation has been prepared under my direction and personally reviewed by me in its entirety. I confirm that the note above accurately reflects all work, treatment, procedures, and medical decision making performed by me. All questions and concerns were addressed to the patient/family. Alternatives to my treatment were discussed. [facility]. [address]. [place], [address]  Phone: [phone]  Fax: [phone]   10/8/2018           If you have questions, please do not hesitate to call me. I look forward to following Marine Reasoner along with you.     Sincerely,        Jhonny Bah MD CP

## 2021-09-06 NOTE — DISCHARGE NOTE PROVIDER - DISCHARGE DIET
..Event monitor placed on patient during visit today. Instructed patient on how to place/remove device and care for the monitor. Instructed patient to notify ordering physician if symptoms worsen  during monitoring.  Patient was instructed to call [facility]

## 2021-09-06 NOTE — CONSULT NOTE ADULT - ASSESSMENT
89/F with PMHx of subdural hematoma s/p evacuation (2015), HLD, anemia, RA, osteoporosis, s/p R TKR, s/p mastoidectomy, and s/p appendectomy, brought  to the ED for  c/o CP.    -CP is atypical, negative troponins x4, no ischemic changes on EKG, no ectopy on tele and TTE showing normal LVSF without wall motion abnormalities. Therefore no concern for ACS. Cannot r/o some angina but can be worked up as outpatient.  -Patient remains stable form a cardiac standpoint and wishes to go home. OK to D/C from cardiac standpoint with close outpatient follow-up.

## 2021-09-06 NOTE — DISCHARGE NOTE PROVIDER - HOSPITAL COURSE
89/F with PMHx of subdural hematoma s/p evacuation, HLD, anemia, RA, osteoporosis, s/p R TKR, s/p mastoidectomy, and s/p appendectomy, brought  to the ED for  c/o sudden onset  chest pain. She states that it was like tightness in the chest this morning. Still has some heaviness in chest. No known Hx of MI. stents, Angina. No c/o back pain, fever, cough, dizziness, N/V/D, diaphoresis.      1) Chest Pain:   - Possible GERD/Atypical CP  observe on tele  serial cardiac enzymes negative  asa 81 stop  cardio consult    2) DVT PPX: lovenox    May fu with PCP and Cardiology for outpatient follow up.

## 2021-09-06 NOTE — PROGRESS NOTE ADULT - SUBJECTIVE AND OBJECTIVE BOX
89/F with PMHx of subdural hematoma s/p evacuation, HLD, anemia, RA, osteoporosis, s/p R TKR, s/p mastoidectomy, and s/p appendectomy, brought  to the ED for  c/o sudden onset  chest pain. She states that it was like tightness in the chest this morning. Still has some heaviness in chest. No known Hx of MI. stents, Angina. No c/o back pain, fever, cough, dizziness, N/V/D, diaphoresis.    Patient seen and examined: No further chest pain this am, no sob, jaw or arm pain. Tolerating po    Other Review of Systems: All other review of systems negative, except as noted in HPI    Vital Signs Last 24 Hrs  T(C): 36.4 (05 Sep 2021 23:07), Max: 36.9 (05 Sep 2021 16:00)  T(F): 97.6 (05 Sep 2021 23:07), Max: 98.5 (05 Sep 2021 16:00)  HR: 59 (05 Sep 2021 23:07) (55 - 89)  BP: 131/54 (05 Sep 2021 23:07) (121/69 - 143/69)  BP(mean): 77 (05 Sep 2021 14:00) (77 - 77)  RR: 17 (05 Sep 2021 23:07) (17 - 18)  SpO2: 100% (05 Sep 2021 23:07) (95% - 100%)      PHYSICAL EXAM:  Constitutional: NAD, awake and alert, well-developed  HEENT: PERR, EOMI, Normal Hearing, MMM  Neck: Soft and supple, No LAD, No JVD  Respiratory: Breath sounds are clear bilaterally, No wheezing, rales or rhonchi  Cardiovascular: S1 and S2, regular rate and rhythm, no Murmurs, gallops or rubs  Gastrointestinal: Bowel Sounds present, soft, nontender, nondistended, no guarding, no rebound  Extremities: No peripheral edema  Vascular: 2+ peripheral pulses  Neurological: A/O x 3, no focal deficits  Musculoskeletal: 5/5 strength b/l upper and lower extremities  Skin: No rashes      LABS: All Labs Reviewed:                        14.8   6.13  )-----------( 261      ( 05 Sep 2021 14:44 )             45.4     09-05    139  |  107  |  11  ----------------------------<  121<H>  3.8   |  26  |  0.92    Ca    8.5      05 Sep 2021 14:44    TPro  7.2  /  Alb  3.6  /  TBili  0.6  /  DBili  x   /  AST  28  /  ALT  23  /  AlkPhos  92  09-05    PT/INR - ( 05 Sep 2021 14:44 )   PT: 11.3 sec;   INR: 0.97 ratio         PTT - ( 05 Sep 2021 14:44 )  PTT:31.8 sec  CARDIAC MARKERS ( 05 Sep 2021 22:40 )  <0.015 ng/mL / x     / x     / x     / x      CARDIAC MARKERS ( 05 Sep 2021 20:07 )  <0.015 ng/mL / x     / x     / x     / x      CARDIAC MARKERS ( 05 Sep 2021 17:00 )  <0.015 ng/mL / x     / x     / x     / x      CARDIAC MARKERS ( 05 Sep 2021 14:44 )  <0.015 ng/mL / x     / x     / x     / x

## 2021-09-06 NOTE — CONSULT NOTE ADULT - SUBJECTIVE AND OBJECTIVE BOX
CHIEF COMPLAINT:  Patient is a 89y old  Female who presents with a chief complaint of cp    HPI:  89/F with PMHx of subdural hematoma s/p evacuation (), HLD, anemia, RA, osteoporosis, s/p R TKR, s/p mastoidectomy, and s/p appendectomy, brought  to the ED for  c/o sudden onset  chest pain. When she woke up she noticed some tightness in the center of her chest that was mild. It lasted a few hours. She ate breakfast with no change but after did some streching and it resolved. She had no other symptoms with it and exertion didn't seem to make it worse, currently CP free. No known Hx of MI/CAD.    Patient seen and examined at bedside.  She wishes to go home.  She denies any fevers, chills, current CP, palp, SOB, abd pain, N/V, dizziness, syncope, orthopnea, PND.        PMHx:  PAST MEDICAL & SURGICAL HISTORY:  Hyperlipidemia  Subdural hematoma 2015  HLD (hyperlipidemia)  Anemia  Rheumatoid arthritis  Osteoporosis  S/P knee replacement - right  S/P appy  S/P tonsillectomy  S/P evacuation of subdural hematoma  Status post mastoidectomy - right    Social History:  non smoker  non alcoholic    FAMILY HISTORY:   FAMILY HISTORY:  Family history unobtainable due to patient&#x27;s condition    ALLERGIES:  Allergies  codeine (Other (Moderate))    REVIEW OF SYSTEMS:  10 system ROS was obtained, all pertinent positives and negatives are in HPI otherwise they were negative.    Vital Signs Last 24 Hrs  T(C): 36.4 (05 Sep 2021 23:07), Max: 36.9 (05 Sep 2021 16:00)  T(F): 97.6 (05 Sep 2021 23:07), Max: 98.5 (05 Sep 2021 16:00)  HR: 59 (05 Sep 2021 23:07) (55 - 89)  BP: 131/54 (05 Sep 2021 23:07) (121/69 - 143/69)  BP(mean): 77 (05 Sep 2021 14:00) (77 - 77)  RR: 17 (05 Sep 2021 23:07) (17 - 18)  SpO2: 100% (05 Sep 2021 23:07) (95% - 100%)    PHYSICAL EXAM:   Constitutional: awake and alert in NAD  HEENT: EOMI, Normal Hearing, MMM  Neck: Soft and supple, No LAD, No JVD, no carotid bruit  Respiratory: Breath sounds are clear bilaterally, No wheezing, rales or rhonchi, good air movement  Cardiovascular: S1 and S2, regular rate and rhythm, soft systolic murmur at LSB  Gastrointestinal: Bowel Sounds present, soft, nontender, nondistended, no guarding, no rebound  Extremities: Warm and well perfused, no peripheral edema  Neurological: A/O x 3, no focal deficits appreciated  Skin: No rashes appreciated    MEDICATIONS:  MEDICATIONS  (STANDING):  aspirin  chewable 81 milliGRAM(s) Oral daily  influenza   Vaccine 0.5 milliLiter(s) IntraMuscular once    LABS: All Labs Reviewed:                        14.8   6.13  )-----------( 261      ( 05 Sep 2021 14:44 )             45.4         139  |  107  |  11  ----------------------------<  121<H>  3.8   |  26  |  0.92    Ca    8.5      05 Sep 2021 14:44    TPro  7.2  /  Alb  3.6  /  TBili  0.6  /  DBili  x   /  AST  28  /  ALT  23  /  AlkPhos  92      PT/INR - ( 05 Sep 2021 14:44 )   PT: 11.3 sec;   INR: 0.97 ratio    PTT - ( 05 Sep 2021 14:44 )  PTT:31.8 sec    CARDIAC MARKERS ( 05 Sep 2021 22:40 )  <0.015 ng/mL / x     / x     / x     / x      CARDIAC MARKERS ( 05 Sep 2021 20:07 )  <0.015 ng/mL / x     / x     / x     / x      CARDIAC MARKERS ( 05 Sep 2021 17:00 )  <0.015 ng/mL / x     / x     / x     / x      CARDIAC MARKERS ( 05 Sep 2021 14:44 )  <0.015 ng/mL / x     / x     / x     / x        Serum Pro-Brain Natriuretic Peptide: 268 pg/mL ( @ 14:44)    RADIOLOGY:    Reviewed in EMR    EK/5/21, my interpretation: Sinus with sinus arrhythmia at about 68bpm, normal EKG    TELEMETRY:    Reviewed, no significant events appreciated, remains in sinus HR mostly 60-80s    ECHO:    Formal read pending but my interpretation: Normal LVSF EF 55-60%, no RWMA, stage I diastolic dysfunction (normal for age) Mild TR, trace MR/PI.